# Patient Record
Sex: FEMALE | Race: WHITE
[De-identification: names, ages, dates, MRNs, and addresses within clinical notes are randomized per-mention and may not be internally consistent; named-entity substitution may affect disease eponyms.]

---

## 2021-06-04 ENCOUNTER — HOSPITAL ENCOUNTER (EMERGENCY)
Dept: HOSPITAL 79 - ER1 | Age: 56
LOS: 1 days | Discharge: HOME | End: 2021-06-05
Payer: COMMERCIAL

## 2021-06-04 DIAGNOSIS — S23.3XXA: ICD-10-CM

## 2021-06-04 DIAGNOSIS — R51.9: ICD-10-CM

## 2021-06-04 DIAGNOSIS — R07.9: ICD-10-CM
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
## 2021-06-04 DIAGNOSIS — S16.1XXA: ICD-10-CM

## 2021-06-04 DIAGNOSIS — S46.912A: ICD-10-CM

## 2021-06-04 DIAGNOSIS — S33.5XXA: Primary | ICD-10-CM

## 2021-06-04 LAB
BUN/CREATININE RATIO: 16 (ref 0–10)
HGB BLD-MCNC: 13.6 GM/DL (ref 12.3–15.3)
RED BLOOD COUNT: 4.03 M/UL (ref 4–5.1)
WHITE BLOOD COUNT: 7.3 K/UL (ref 4.5–11)

## 2022-01-12 ENCOUNTER — HOSPITAL ENCOUNTER (OUTPATIENT)
Dept: HOSPITAL 79 - RAD | Age: 57
End: 2022-01-12
Attending: NURSE PRACTITIONER
Payer: COMMERCIAL

## 2022-01-12 DIAGNOSIS — M25.561: Primary | ICD-10-CM

## 2022-01-12 DIAGNOSIS — M25.562: ICD-10-CM

## 2022-01-12 DIAGNOSIS — M79.605: ICD-10-CM

## 2022-01-12 DIAGNOSIS — M25.462: ICD-10-CM

## 2022-01-12 DIAGNOSIS — M17.11: ICD-10-CM

## 2022-03-07 ENCOUNTER — TRANSCRIBE ORDERS (OUTPATIENT)
Dept: ADMINISTRATIVE | Facility: HOSPITAL | Age: 57
End: 2022-03-07

## 2022-03-07 DIAGNOSIS — M25.562 LEFT KNEE PAIN, UNSPECIFIED CHRONICITY: Primary | ICD-10-CM

## 2022-03-18 ENCOUNTER — TRANSCRIBE ORDERS (OUTPATIENT)
Dept: ADMINISTRATIVE | Facility: HOSPITAL | Age: 57
End: 2022-03-18

## 2022-03-18 ENCOUNTER — HOSPITAL ENCOUNTER (OUTPATIENT)
Dept: GENERAL RADIOLOGY | Facility: HOSPITAL | Age: 57
Discharge: HOME | End: 2022-03-18
Admitting: NURSE PRACTITIONER

## 2022-03-18 DIAGNOSIS — M25.562 LEFT KNEE PAIN, UNSPECIFIED CHRONICITY: ICD-10-CM

## 2022-03-18 DIAGNOSIS — M25.562 LEFT KNEE PAIN, UNSPECIFIED CHRONICITY: Primary | ICD-10-CM

## 2022-03-18 DIAGNOSIS — M79.605 LEFT LEG PAIN: ICD-10-CM

## 2022-03-18 PROCEDURE — 73564 X-RAY EXAM KNEE 4 OR MORE: CPT | Performed by: RADIOLOGY

## 2022-03-18 PROCEDURE — 73564 X-RAY EXAM KNEE 4 OR MORE: CPT

## 2022-03-18 PROCEDURE — 73590 X-RAY EXAM OF LOWER LEG: CPT | Performed by: RADIOLOGY

## 2022-03-18 PROCEDURE — 73590 X-RAY EXAM OF LOWER LEG: CPT

## 2022-04-14 ENCOUNTER — OFFICE VISIT (OUTPATIENT)
Dept: ENDOCRINOLOGY | Facility: CLINIC | Age: 57
End: 2022-04-14

## 2022-04-14 ENCOUNTER — LAB (OUTPATIENT)
Dept: LAB | Facility: HOSPITAL | Age: 57
End: 2022-04-14

## 2022-04-14 VITALS
WEIGHT: 198.6 LBS | OXYGEN SATURATION: 96 % | SYSTOLIC BLOOD PRESSURE: 162 MMHG | BODY MASS INDEX: 33.09 KG/M2 | DIASTOLIC BLOOD PRESSURE: 84 MMHG | HEART RATE: 73 BPM | HEIGHT: 65 IN

## 2022-04-14 DIAGNOSIS — E03.8 HYPOTHYROIDISM DUE TO HASHIMOTO'S THYROIDITIS: Primary | ICD-10-CM

## 2022-04-14 DIAGNOSIS — E06.3 HYPOTHYROIDISM DUE TO HASHIMOTO'S THYROIDITIS: Primary | ICD-10-CM

## 2022-04-14 PROCEDURE — 84443 ASSAY THYROID STIM HORMONE: CPT | Performed by: NURSE PRACTITIONER

## 2022-04-14 PROCEDURE — 99203 OFFICE O/P NEW LOW 30 MIN: CPT | Performed by: NURSE PRACTITIONER

## 2022-04-14 PROCEDURE — 36415 COLL VENOUS BLD VENIPUNCTURE: CPT | Performed by: NURSE PRACTITIONER

## 2022-04-14 RX ORDER — CLONAZEPAM 0.5 MG/1
0.5 TABLET ORAL 2 TIMES DAILY PRN
COMMUNITY

## 2022-04-14 RX ORDER — ARIPIPRAZOLE 2 MG/1
2 TABLET ORAL DAILY
COMMUNITY

## 2022-04-14 RX ORDER — GABAPENTIN 800 MG/1
600 TABLET ORAL 3 TIMES DAILY
COMMUNITY

## 2022-04-14 RX ORDER — ATENOLOL 25 MG/1
25 TABLET ORAL DAILY
COMMUNITY

## 2022-04-14 RX ORDER — LEVOTHYROXINE SODIUM 0.03 MG/1
25 TABLET ORAL DAILY
COMMUNITY
End: 2022-04-15

## 2022-04-14 RX ORDER — MULTIPLE VITAMINS W/ MINERALS TAB 9MG-400MCG
1 TAB ORAL DAILY
COMMUNITY

## 2022-04-14 RX ORDER — ERGOCALCIFEROL 1.25 MG/1
50000 CAPSULE ORAL WEEKLY
COMMUNITY

## 2022-04-14 NOTE — PROGRESS NOTES
Chief Complaint   Patient presents with   • Thyroid Problem     PCP told pt her antibodies were attacking her thyroid   Had a knot come up on thyroid in December 2021          Referring Provider  Karen Ortiz A* HPI   Paris Adrian is a 56 y.o. female had concerns including Thyroid Problem (PCP told pt her antibodies were attacking her thyroid   Had a knot come up on thyroid in December 2021  ).   Seen as a new patient today.    She was on T4 in the past but stopped taking it for a long time, then she was started back on after her most recent labs.  She was placed on higher doses in the past and she felt like she was having side effects to the medication-she felt like it caused some discomfort in .  She started back on T4 at the end of Feb 25 mcg daily.  She doesn't always take as prescribed.  She had COVID in Sept 2021.    Birth state:[place]  Previous history of radiation to face/neck: none  Consuming foods high in iodine: none  Family history of thyroid complications: cousins, no hx of thyroid cancer that she knows of.    The following portions of the patient's history were reviewed and updated as appropriate: allergies, current medications, past family history, past medical history, past social history, past surgical history and problem list.    No past medical history on file.  Past Surgical History:   Procedure Laterality Date   • CHOLECYSTECTOMY     • HYSTERECTOMY        No family history on file.   Social History     Socioeconomic History   • Marital status:    • Number of children: 2   Tobacco Use   • Smoking status: Light Tobacco Smoker     Types: Cigarettes   • Smokeless tobacco: Never Used   • Tobacco comment: Pt says she is a social smoker    1 or 2 times a month   Vaping Use   • Vaping Use: Former   Substance and Sexual Activity   • Alcohol use: Yes     Comment: Only when something tragic happens   • Drug use: Yes     Types: Marijuana     Comment: On occasion   • Sexual activity: Yes  "     Allergies   Allergen Reactions   • Demerol [Meperidine] Itching      Current Outpatient Medications on File Prior to Visit   Medication Sig Dispense Refill   • ARIPiprazole (ABILIFY) 2 MG tablet Take 2 mg by mouth Daily.     • atenolol (TENORMIN) 25 MG tablet Take 25 mg by mouth Daily.     • clonazePAM (KlonoPIN) 0.5 MG tablet Take 0.5 mg by mouth 2 (Two) Times a Day As Needed.     • gabapentin (NEURONTIN) 600 MG tablet Take 600 mg by mouth 3 (Three) Times a Day.     • levothyroxine (SYNTHROID, LEVOTHROID) 25 MCG tablet Take 25 mcg by mouth Daily.     • multivitamin with minerals tablet tablet Take 1 tablet by mouth Daily.     • vitamin D (ERGOCALCIFEROL) 1.25 MG (97711 UT) capsule capsule Take 50,000 Units by mouth 1 (One) Time Per Week.       No current facility-administered medications on file prior to visit.        Review of Systems   Constitutional: Positive for fatigue and unexpected weight gain.   Eyes: Positive for blurred vision.   Gastrointestinal: Negative for constipation.   Endocrine: Positive for cold intolerance.   Skin: Positive for dry skin.        Hair loss-after COVID   Psychiatric/Behavioral: Positive for sleep disturbance.   All other systems reviewed and are negative.    /84 (BP Location: Right arm, Patient Position: Sitting, Cuff Size: Adult)   Pulse 73   Ht 165.1 cm (65\")   Wt 90.1 kg (198 lb 9.6 oz)   SpO2 96%   BMI 33.05 kg/m²      Physical Exam  Vitals reviewed.   Constitutional:       Appearance: Normal appearance.   Eyes:      Extraocular Movements: Extraocular movements intact.   Neck:      Thyroid: No thyroid mass, thyromegaly or thyroid tenderness.      Comments: No palpable nodules.  Cardiovascular:      Rate and Rhythm: Normal rate and regular rhythm.      Pulses: Normal pulses.      Heart sounds: Normal heart sounds.   Pulmonary:      Effort: Pulmonary effort is normal.      Breath sounds: Normal breath sounds.   Neurological:      Mental Status: She is alert and " oriented to person, place, and time.   Psychiatric:         Mood and Affect: Mood normal.         Behavior: Behavior normal.         Thought Content: Thought content normal.         Judgment: Judgment normal.       Labs/Imaging  CMP  No results found for: GLUCOSE, BUN, CREATININE, EGFRIFNONA, EGFRIFAFRI, BCR, K, CO2, CALCIUM, PROTENTOTREF, ALBUMIN, LABIL2, BILIRUBIN, AST, ALT     CBC w/DIFF No results found for: WBC, RBC, HGB, HCT, MCV, MCH, MCHC, RDW, RDWSD, MPV, PLT, NEUTRORELPCT, LYMPHORELPCT, MONORELPCT, EOSRELPCT, BASORELPCT, AUTOIGPER, NEUTROABS, LYMPHSABS, MONOSABS, EOSABS, BASOSABS, AUTOIGNUM, NRBC    TSH  No results found for: TSH    T4  No results found for: FREET4  No results found for: [id number]    T3  No results found for: T3FREE  No results found for: R2XFSYL    TRAb  No results found for: TSURCPAB    TPO  No results found for: THYROIDAB    No valid procedures specified.    Assessment and Plan    Diagnoses and all orders for this visit:    1. Hypothyroidism due to Hashimoto's thyroiditis (Primary)  Assessment & Plan:  -Clinically hypothyroid.  -Will obtain TFT's today and adjust meds as indicated.  -Follow-up in 6 weeks.    Orders:  -     Cancel: TSH  -     TSH  -     TSH       Return in about 6 weeks (around 5/26/2022) for Follow-up appointment. The patient was instructed to contact the clinic with any interval questions or concerns.    JEANIE Wright   Endocrinology

## 2022-04-14 NOTE — ASSESSMENT & PLAN NOTE
-Clinically hypothyroid.  -Will obtain TFT's today and adjust meds as indicated.  -Follow-up in 6 weeks.

## 2022-04-15 DIAGNOSIS — M25.562 LEFT KNEE PAIN, UNSPECIFIED CHRONICITY: Primary | ICD-10-CM

## 2022-04-15 LAB — TSH SERPL DL<=0.05 MIU/L-ACNC: 13.7 UIU/ML (ref 0.27–4.2)

## 2022-04-15 RX ORDER — LEVOTHYROXINE SODIUM 0.05 MG/1
25 TABLET ORAL DAILY
Qty: 30 TABLET | Refills: 2 | Status: SHIPPED | OUTPATIENT
Start: 2022-04-15 | End: 2022-04-20 | Stop reason: SDUPTHER

## 2022-04-20 ENCOUNTER — TELEPHONE (OUTPATIENT)
Dept: ENDOCRINOLOGY | Facility: CLINIC | Age: 57
End: 2022-04-20

## 2022-04-20 RX ORDER — LEVOTHYROXINE SODIUM 0.05 MG/1
25 TABLET ORAL DAILY
Qty: 30 TABLET | Refills: 2 | Status: SHIPPED | OUTPATIENT
Start: 2022-04-20

## 2022-04-20 NOTE — TELEPHONE ENCOUNTER
----- Message from JEANIE Wright sent at 4/15/2022 12:39 PM EDT -----  Please call and let her know that her thyroid levels are still significantly elevated.  She needs to increase Levothyroxine to 50 mcg daily.  Will send meds to pharmacy.

## 2022-05-23 ENCOUNTER — HOSPITAL ENCOUNTER (EMERGENCY)
Dept: HOSPITAL 79 - ER1 | Age: 57
LOS: 1 days | Discharge: LEFT BEFORE DISCHARGE | End: 2022-05-24
Payer: COMMERCIAL

## 2022-05-23 DIAGNOSIS — Z53.21: Primary | ICD-10-CM

## 2022-10-12 ENCOUNTER — HOSPITAL ENCOUNTER (OUTPATIENT)
Dept: GENERAL RADIOLOGY | Facility: HOSPITAL | Age: 57
Discharge: HOME | End: 2022-10-12
Admitting: NURSE PRACTITIONER

## 2022-10-12 ENCOUNTER — TRANSCRIBE ORDERS (OUTPATIENT)
Dept: ADMINISTRATIVE | Facility: HOSPITAL | Age: 57
End: 2022-10-12

## 2022-10-12 DIAGNOSIS — M54.2 CERVICAL PAIN: Primary | ICD-10-CM

## 2022-10-12 PROCEDURE — 72050 X-RAY EXAM NECK SPINE 4/5VWS: CPT

## 2022-10-12 PROCEDURE — 72050 X-RAY EXAM NECK SPINE 4/5VWS: CPT | Performed by: RADIOLOGY

## 2022-11-23 ENCOUNTER — TRANSCRIBE ORDERS (OUTPATIENT)
Dept: ADMINISTRATIVE | Facility: HOSPITAL | Age: 57
End: 2022-11-23

## 2022-11-23 DIAGNOSIS — M25.562 LEFT KNEE PAIN, UNSPECIFIED CHRONICITY: Primary | ICD-10-CM

## 2022-12-16 ENCOUNTER — HOSPITAL ENCOUNTER (OUTPATIENT)
Dept: MRI IMAGING | Facility: HOSPITAL | Age: 57
Discharge: HOME | End: 2022-12-16

## 2022-12-16 ENCOUNTER — HOSPITAL ENCOUNTER (OUTPATIENT)
Dept: GENERAL RADIOLOGY | Facility: HOSPITAL | Age: 57
Discharge: HOME | End: 2022-12-16

## 2022-12-16 ENCOUNTER — TRANSCRIBE ORDERS (OUTPATIENT)
Dept: ADMINISTRATIVE | Facility: HOSPITAL | Age: 57
End: 2022-12-16

## 2022-12-16 DIAGNOSIS — M25.562 LEFT KNEE PAIN, UNSPECIFIED CHRONICITY: ICD-10-CM

## 2022-12-16 DIAGNOSIS — M54.40 LOW BACK PAIN WITH SCIATICA, SCIATICA LATERALITY UNSPECIFIED, UNSPECIFIED BACK PAIN LATERALITY, UNSPECIFIED CHRONICITY: ICD-10-CM

## 2022-12-16 DIAGNOSIS — M54.40 LOW BACK PAIN WITH SCIATICA, SCIATICA LATERALITY UNSPECIFIED, UNSPECIFIED BACK PAIN LATERALITY, UNSPECIFIED CHRONICITY: Primary | ICD-10-CM

## 2022-12-16 PROCEDURE — 72110 X-RAY EXAM L-2 SPINE 4/>VWS: CPT | Performed by: RADIOLOGY

## 2022-12-16 PROCEDURE — 73721 MRI JNT OF LWR EXTRE W/O DYE: CPT | Performed by: RADIOLOGY

## 2022-12-16 PROCEDURE — 73721 MRI JNT OF LWR EXTRE W/O DYE: CPT

## 2022-12-16 PROCEDURE — 72110 X-RAY EXAM L-2 SPINE 4/>VWS: CPT

## 2023-01-30 ENCOUNTER — OFFICE VISIT (OUTPATIENT)
Dept: ORTHOPEDIC SURGERY | Facility: CLINIC | Age: 58
End: 2023-01-30
Payer: COMMERCIAL

## 2023-01-30 VITALS
DIASTOLIC BLOOD PRESSURE: 64 MMHG | SYSTOLIC BLOOD PRESSURE: 97 MMHG | HEART RATE: 85 BPM | BODY MASS INDEX: 32.99 KG/M2 | HEIGHT: 65 IN | WEIGHT: 198 LBS

## 2023-01-30 DIAGNOSIS — M25.562 LEFT KNEE PAIN, UNSPECIFIED CHRONICITY: ICD-10-CM

## 2023-01-30 DIAGNOSIS — S83.282A OTHER TEAR OF LATERAL MENISCUS OF LEFT KNEE AS CURRENT INJURY, INITIAL ENCOUNTER: Primary | ICD-10-CM

## 2023-01-30 PROCEDURE — 20610 DRAIN/INJ JOINT/BURSA W/O US: CPT | Performed by: PHYSICIAN ASSISTANT

## 2023-01-30 PROCEDURE — 99203 OFFICE O/P NEW LOW 30 MIN: CPT | Performed by: PHYSICIAN ASSISTANT

## 2023-01-30 RX ORDER — LIDOCAINE HYDROCHLORIDE 10 MG/ML
5 INJECTION, SOLUTION EPIDURAL; INFILTRATION; INTRACAUDAL; PERINEURAL
Status: COMPLETED | OUTPATIENT
Start: 2023-01-30 | End: 2023-01-30

## 2023-01-30 RX ORDER — TRAMADOL HYDROCHLORIDE 50 MG/1
TABLET ORAL
COMMUNITY
Start: 2023-01-13

## 2023-01-30 RX ORDER — METHYLPREDNISOLONE ACETATE 80 MG/ML
80 INJECTION, SUSPENSION INTRA-ARTICULAR; INTRALESIONAL; INTRAMUSCULAR; SOFT TISSUE
Status: COMPLETED | OUTPATIENT
Start: 2023-01-30 | End: 2023-01-30

## 2023-01-30 RX ADMIN — METHYLPREDNISOLONE ACETATE 80 MG: 80 INJECTION, SUSPENSION INTRA-ARTICULAR; INTRALESIONAL; INTRAMUSCULAR; SOFT TISSUE at 16:06

## 2023-01-30 RX ADMIN — LIDOCAINE HYDROCHLORIDE 5 ML: 10 INJECTION, SOLUTION EPIDURAL; INFILTRATION; INTRACAUDAL; PERINEURAL at 16:06

## 2023-01-30 NOTE — PROGRESS NOTES
[facility] [place] [facility] New Patient Visit          Patient: Paris Adrian  YOB: 1965  Date of Encounter: 01/30/2023  PCP: Nasir De La Garza APRN      Subjective     Chief Complaint   Patient presents with   • Left Knee - Initial Evaluation, Pain           History of Present Illness:     Paris Adrian is a 57 y.o. female presents today as result of left knee pain ongoing for the last several months.  Patient reports no specific injury.  She describes that there are times in which over the last year and a half that she will have a catching sensation and popping with difficulty upon ambulation and weightbearing.  The patient reports intermittent swelling as well as pain anterior laterally.  The patient has undergone no conservative treatment options in reference to injections however the patient has undergone formal outpatient physical therapy temporarily with no benefit.  No improvement with NSAIDs.  Patient presents today for MRI results review left knee and consultation.        Patient Active Problem List   Diagnosis   • Hypothyroidism due to Hashimoto's thyroiditis     Past Medical History:   Diagnosis Date   • Thyroid disease      Past Surgical History:   Procedure Laterality Date   • CHOLECYSTECTOMY     • HYSTERECTOMY       Social History     Occupational History   • Not on file   Tobacco Use   • Smoking status: Light Smoker     Packs/day: 1.00     Types: Cigarettes   • Smokeless tobacco: Never   • Tobacco comments:     Pt says she is a social smoker    1 or 2 times a month   Vaping Use   • Vaping Use: Never used   Substance and Sexual Activity   • Alcohol use: Not Currently   • Drug use: Never   • Sexual activity: Yes    Paris Adrian  reports that she has been smoking cigarettes. She has been smoking an average of 1 pack per day. She has never used smokeless tobacco.. I have educated her on the risk of diseases from using tobacco products such as cancer, COPD and heart disease.  "    I advised her to quit and she is not willing to quit.    I spent 3  minutes counseling the patient.          Social History     Social History Narrative   • Not on file     History reviewed. No pertinent family history.  Current Outpatient Medications   Medication Sig Dispense Refill   • atenolol (TENORMIN) 25 MG tablet Take 25 mg by mouth Daily.     • clonazePAM (KlonoPIN) 0.5 MG tablet Take 0.5 mg by mouth 2 (Two) Times a Day As Needed.     • gabapentin (NEURONTIN) 800 MG tablet Take 600 mg by mouth 3 (Three) Times a Day.     • levothyroxine (SYNTHROID, LEVOTHROID) 50 MCG tablet Take 0.5 tablets by mouth Daily. 30 tablet 2   • traMADol (ULTRAM) 50 MG tablet TAKE TWO TABLETS BY MOUTH TWO TIMES A DAY FOR PAIN     • vitamin D (ERGOCALCIFEROL) 1.25 MG (67594 UT) capsule capsule Take 50,000 Units by mouth 1 (One) Time Per Week.     • ARIPiprazole (ABILIFY) 2 MG tablet Take 2 mg by mouth Daily.     • multivitamin with minerals tablet tablet Take 1 tablet by mouth Daily.       No current facility-administered medications for this visit.     Allergies   Allergen Reactions   • Demerol [Meperidine] Itching            Review of Systems   Constitutional:        Activity change   HENT: Negative.    Eyes: Negative.    Cardiovascular: Positive for leg swelling.   Respiratory: Negative.    Endocrine: Negative.    Hematologic/Lymphatic: Bruises/bleeds easily.   Skin: Negative.    Musculoskeletal: Positive for back pain, joint pain, joint swelling and neck pain.        Pertinent positives listed in HPI   Gastrointestinal: Negative.    Genitourinary: Negative.    Neurological: Positive for numbness and weakness.   Psychiatric/Behavioral: The patient has insomnia and is nervous/anxious.    Allergic/Immunologic: Negative.          Objective      Vitals:    01/30/23 1527   BP: 97/64   Pulse: 85   Weight: 89.8 kg (198 lb)   Height: 165.1 cm (65\")      BMI is >= 30 and <35. (Class 1 Obesity). The following options were offered after " discussion;: exercise counseling/recommendations and nutrition counseling/recommendations      Physical Exam  Vitals and nursing note reviewed.   Constitutional:       General: She is not in acute distress.     Appearance: She is not ill-appearing.   HENT:      Head: Normocephalic and atraumatic.      Right Ear: External ear normal.      Left Ear: External ear normal.      Nose: Nose normal. No congestion or rhinorrhea.   Eyes:      Extraocular Movements: Extraocular movements intact.      Conjunctiva/sclera: Conjunctivae normal.      Pupils: Pupils are equal, round, and reactive to light.   Cardiovascular:      Rate and Rhythm: Normal rate.      Pulses: Normal pulses.   Pulmonary:      Effort: Pulmonary effort is normal. No respiratory distress.      Breath sounds: No stridor.   Abdominal:      General: There is no distension.   Musculoskeletal:      Cervical back: Normal range of motion.      Comments: Left knee reveals scant effusion with medial and lateral joint line tenderness to palpation.  Patient does have mild valgus alignment with no significant laxity upon varus stress.  Lachman and drawer testing negative.  Ben's and Apley's grind test positive laterally.  Neurovascular status grossly intact left lower extremity.   Skin:     General: Skin is warm and dry.      Capillary Refill: Capillary refill takes less than 2 seconds.   Neurological:      General: No focal deficit present.      Mental Status: She is alert and oriented to person, place, and time.   Psychiatric:         Mood and Affect: Mood normal.         Behavior: Behavior normal.         Thought Content: Thought content normal.         Judgment: Judgment normal.                 Radiology:        MRI Knee Left Without Contrast    Result Date: 12/16/2022  1.  Small knee joint effusion. 2.  Tear involving the anterior horn and body of the lateral meniscus. 3.  Lateral joint space narrowing and lateral joint space cartilage thinning. 4.  Small  popliteal region cyst measuring 2.5 cm.  This report was finalized on 12/16/2022 1:30 PM by Dr. Vin Jansen MD.      XR Spine Lumbar Complete 4+VW    Result Date: 12/16/2022    Degenerative changes lumbar spine as described.  This report was finalized on 12/16/2022 12:10 PM by Dr. Vin Jansen MD.            Assessment/Plan        ICD-10-CM ICD-9-CM   1. Left knee pain, unspecified chronicity  M25.562 719.46   2. Other tear of lateral meniscus of left knee as current injury, initial encounter  S83.282A 836.1       57-year-old female with longstanding left knee pain with MRI evidence consistent with recurrent knee effusion as well as a suspected degenerative tear involving anterior horn lateral meniscus in the body with lateral joint space narrowing joint space cartilage thinning.  Further discussion was had with patient and given the degenerative changes the patient was provided with an intra-articular steroidal injection with 80 mg Depo-Medrol and lidocaine block injected into the intra-articular space of the left knee.  The patient tolerated this procedure well.  She was instructed to return back in 4 weeks for further evaluation.      Large Joint Arthrocentesis: L knee  Date/Time: 1/30/2023 4:06 PM  Consent given by: patient  Site marked: site marked  Timeout: Immediately prior to procedure a time out was called to verify the correct patient, procedure, equipment, support staff and site/side marked as required   Supporting Documentation  Indications: pain and diagnostic evaluation   Procedure Details  Location: knee - L knee  Needle size: 25 G  Approach: anterolateral  Medications administered: 80 mg methylPREDNISolone acetate 80 MG/ML; 5 mL lidocaine PF 1% 1 %  Patient tolerance: patient tolerated the procedure well with no immediate complications                      This document was signed by Luis Marcelino PA-C January 30, 2023     CC: Nasir De La Garza APRN    Dictated Utilizing Dragon Dictation:    Please note that portions of this note were completed with a voice recognition program.   Part of this note may be an electronic transcription/translation of spoken language to printed text using the Dragon Dictation System.

## 2023-03-06 ENCOUNTER — OFFICE VISIT (OUTPATIENT)
Dept: ORTHOPEDIC SURGERY | Facility: CLINIC | Age: 58
End: 2023-03-06
Payer: COMMERCIAL

## 2023-03-06 VITALS — WEIGHT: 173 LBS | HEIGHT: 65 IN | BODY MASS INDEX: 28.82 KG/M2

## 2023-03-06 DIAGNOSIS — S83.282A OTHER TEAR OF LATERAL MENISCUS OF LEFT KNEE AS CURRENT INJURY, INITIAL ENCOUNTER: Primary | ICD-10-CM

## 2023-03-06 DIAGNOSIS — M25.562 LEFT KNEE PAIN, UNSPECIFIED CHRONICITY: ICD-10-CM

## 2023-03-06 PROCEDURE — 99213 OFFICE O/P EST LOW 20 MIN: CPT | Performed by: PHYSICIAN ASSISTANT

## 2023-03-13 ENCOUNTER — TELEPHONE (OUTPATIENT)
Dept: ORTHOPEDIC SURGERY | Facility: CLINIC | Age: 58
End: 2023-03-13
Payer: COMMERCIAL

## 2023-03-13 NOTE — TELEPHONE ENCOUNTER
Caller: SORIN   Relationship to Patient: SELF   Phone Number: [phone]   Reason for Call: PATIENT CALLING ASKING ABOUT REFERRAL FROM 03/06/23 STATES MATT WAS SUPPOSED TO SEND A REFERRAL FOR HER BUT NOTES NOT SIGNED

## 2023-03-13 NOTE — TELEPHONE ENCOUNTER
Returned patients call and spoke to . Let the  know that once the note is done and the order was placed we would start working on referral.

## 2023-03-20 ENCOUNTER — TELEPHONE (OUTPATIENT)
Dept: ORTHOPEDIC SURGERY | Facility: CLINIC | Age: 58
End: 2023-03-20
Payer: COMMERCIAL

## 2023-03-20 NOTE — TELEPHONE ENCOUNTER
Caller: MARTINE    Relationship: SELF    Best call back number: [phone]      What was the call regarding: PATIENT IS CALLING IN REGARDS TO GETTING HER SURGERY SET UP.     Do you require a callback: YES

## 2023-03-20 NOTE — PROGRESS NOTES
[facility] [place]          Patient: Paris Adrian  YOB: 1965  Date of Encounter: 3/6/2023  PCP: Nasir De La Garza APRN      Subjective     Chief Complaint   Patient presents with   • Left Knee - Follow-up, Pain, Edema           History of Present Illness:     Paris Adrian is a 57 y.o. female presents today as result of left knee pain ongoing for the last several months.  Patient reports no specific injury.  She describes that there are times in which over the last year and a half that she will have a catching sensation and popping with difficulty upon ambulation and weightbearing.  The patient reports intermittent swelling as well as pain anterior laterally.  The patient has undergone therapy as well as previous steroid injection with continuation of pain and symptoms and recurrence of intermittent swelling.  She reports no significant improvement since last office visit.         Patient Active Problem List   Diagnosis   • Hypothyroidism due to Hashimoto's thyroiditis     Past Medical History:   Diagnosis Date   • Thyroid disease      Past Surgical History:   Procedure Laterality Date   •  SECTION      X2   • CHOLECYSTECTOMY     • HYSTERECTOMY       Social History     Occupational History   • Not on file   Tobacco Use   • Smoking status: Every Day     Packs/day: 0.25     Types: Cigarettes   • Smokeless tobacco: Never   • Tobacco comments:     Pt says she is a social smoker    1 or 2 times a month   Vaping Use   • Vaping Use: Never used   Substance and Sexual Activity   • Alcohol use: Not Currently   • Drug use: Never   • Sexual activity: Yes    Paris Adrian  reports that she has been smoking cigarettes. She has been smoking an average of .25 packs per day. She has never used smokeless tobacco.. I have educated her on the risk of diseases from using tobacco products such as cancer, COPD and heart disease.     I advised her to quit and she is  "not willing to quit.    I spent 3  minutes counseling the patient.          Social History     Social History Narrative   • Not on file     History reviewed. No pertinent family history.  Current Outpatient Medications   Medication Sig Dispense Refill   • clonazePAM (KlonoPIN) 0.5 MG tablet Take 1 tablet by mouth 2 (Two) Times a Day As Needed.     • gabapentin (NEURONTIN) 800 MG tablet Take 600 mg by mouth 3 (Three) Times a Day.     • levothyroxine (SYNTHROID, LEVOTHROID) 50 MCG tablet Take 0.5 tablets by mouth Daily. 30 tablet 2   • traMADol (ULTRAM) 50 MG tablet TAKE TWO TABLETS BY MOUTH TWO TIMES A DAY FOR PAIN     • vitamin D (ERGOCALCIFEROL) 1.25 MG (74144 UT) capsule capsule Take 1 capsule by mouth 1 (One) Time Per Week.     • ARIPiprazole (ABILIFY) 2 MG tablet Take 1 tablet by mouth Daily.     • atenolol (TENORMIN) 25 MG tablet Take 1 tablet by mouth Daily.     • multivitamin with minerals tablet tablet Take 1 tablet by mouth Daily.       No current facility-administered medications for this visit.     Allergies   Allergen Reactions   • Demerol [Meperidine] Itching            Review of Systems   Constitutional:        Activity change   HENT: Negative.    Eyes: Negative.    Cardiovascular: Positive for leg swelling.   Respiratory: Negative.    Endocrine: Negative.    Hematologic/Lymphatic: Bruises/bleeds easily.   Skin: Negative.    Musculoskeletal: Positive for back pain, joint pain, joint swelling and neck pain.        Pertinent positives listed in HPI   Gastrointestinal: Negative.    Genitourinary: Negative.    Neurological: Positive for numbness and weakness.   Psychiatric/Behavioral: The patient has insomnia and is nervous/anxious.    Allergic/Immunologic: Negative.          Objective      Vitals:    03/06/23 0913   Weight: 78.5 kg (173 lb)   Height: 165.1 cm (65\")      BMI is >= 30 and <35. (Class 1 Obesity). The following options were offered after discussion;: exercise counseling/recommendations and " nutrition counseling/recommendations      Physical Exam  Vitals and nursing note reviewed.   Constitutional:       General: She is not in acute distress.     Appearance: She is not ill-appearing.   HENT:      Head: Normocephalic and atraumatic.      Right Ear: External ear normal.      Left Ear: External ear normal.      Nose: Nose normal. No congestion or rhinorrhea.   Eyes:      Extraocular Movements: Extraocular movements intact.      Conjunctiva/sclera: Conjunctivae normal.      Pupils: Pupils are equal, round, and reactive to light.   Cardiovascular:      Rate and Rhythm: Normal rate.      Pulses: Normal pulses.   Pulmonary:      Effort: Pulmonary effort is normal. No respiratory distress.      Breath sounds: No stridor.   Abdominal:      General: There is no distension.   Musculoskeletal:      Cervical back: Normal range of motion.      Comments: Left knee reveals scant effusion with medial and lateral joint line tenderness to palpation.  Patient does have mild valgus alignment with no significant laxity upon varus stress.  Lachman and drawer testing negative.  Ben's and Apley's grind test positive laterally.  Neurovascular status grossly intact left lower extremity.   Skin:     General: Skin is warm and dry.      Capillary Refill: Capillary refill takes less than 2 seconds.   Neurological:      General: No focal deficit present.      Mental Status: She is alert and oriented to person, place, and time.   Psychiatric:         Mood and Affect: Mood normal.         Behavior: Behavior normal.         Thought Content: Thought content normal.         Judgment: Judgment normal.                 Radiology:        EXAM:    MR Left Lower Extremity Without Intravenous Contrast, Knee     EXAM DATE:    12/16/2022 12:40 PM     CLINICAL HISTORY:    m25.562; M25.562-Pain in left knee     TECHNIQUE:    Multiplanar magnetic resonance images of the left knee without  intravenous contrast.     COMPARISON:    No relevant prior  studies available.     FINDINGS:   BONES/JOINTS/CARTILAGE:    PATELLOFEMORAL COMPARTMENT:  Small knee joint effusion.    FEMOROTIBIAL COMPARTMENTS:  Unremarkable.       EXTENSOR MECHANISM:  Unremarkable.    MEDIAL MENISCUS:  Unremarkable.  No tear.    LATERAL MENISCUS:  Tear involving the anterior horn and body of the  lateral meniscus.    MEDIAL CAPSULE/SUPPORTING STRUCTURES:  Unremarkable.  Normal medial  collateral ligament.    LATERAL CAPSULE/SUPPORTING STRUCTURES:  Unremarkable.  Normal lateral  collateral ligament.    ANTERIOR CRUCIATE LIGAMENT:  Unremarkable.    POSTERIOR CRUCIATE LIGAMENT:  Unremarkable.    MUSCULATURE:  Unremarkable.    SOFT TISSUES:  Small popliteal region cyst measuring 2.5 cm.    OTHER FINDINGS:  Lateral joint space narrowing and lateral joint space  cartilage thinning.     IMPRESSION:  1.  Small knee joint effusion.  2.  Tear involving the anterior horn and body of the lateral meniscus.  3.  Lateral joint space narrowing and lateral joint space cartilage  thinning.  4.  Small popliteal region cyst measuring 2.5 cm.     This report was finalized on 12/16/2022 1:30 PM by Dr. Vin Jansen MD.             Assessment/Plan        ICD-10-CM ICD-9-CM   1. Other tear of lateral meniscus of left knee as current injury, initial encounter  S83.282A 836.1   2. Left knee pain, unspecified chronicity  M25.562 719.46       57-year-old female with longstanding left knee pain and MRI evidence consistent with recurrent knee effusion as well as degenerative anterior horn lateral meniscus tear.  Patient has lateral joint space narrowing and lateral compartment cartilage thinning.  She has failed conservative treatment options with continuation of pain and symptoms.  We discussed the possibility of further diagnostic and therapeutic knee arthroscopy with partial meniscectomy and chondroplasty.  We discussed the risks, benefits, future outcomes of surgery and patient was adamant that she wishes to proceed  with surgical intervention.  Referral has been placed with [facility] orthopedics secondary to the patient's age and activity level as well as the degenerative changes and notable atypical anterior horn lateral meniscus tear.  Patient will undergo consultation and discussion of surgical intervention.                      This document was signed by Luis Marcelino PA-C March 6, 2023    CC: Nasir De La Garza APRN    Dictated Utilizing Dragon Dictation:   Please note that portions of this note were completed with a voice recognition program.   Part of this note may be an electronic transcription/translation of spoken language to printed text using the Dragon Dictation System.

## 2023-03-20 NOTE — TELEPHONE ENCOUNTER
PATIENT CALLED BACK-SAYS IF SHE DOES NOT ANSWER, CALL HER  LANRE SAMPSON CELL # [phone]. CAN LEAVE A VOICE MAIL MESSAGE.

## 2023-03-21 NOTE — TELEPHONE ENCOUNTER
Spoke with patient let her know the referral was refaxed today and she should be receiving a call.

## 2023-11-10 ENCOUNTER — HOSPITAL ENCOUNTER (OUTPATIENT)
Dept: GENERAL RADIOLOGY | Facility: HOSPITAL | Age: 58
Discharge: HOME | End: 2023-11-10
Admitting: ANESTHESIOLOGY
Payer: COMMERCIAL

## 2023-11-10 ENCOUNTER — TRANSCRIBE ORDERS (OUTPATIENT)
Dept: ADMINISTRATIVE | Facility: HOSPITAL | Age: 58
End: 2023-11-10
Payer: COMMERCIAL

## 2023-11-10 DIAGNOSIS — M25.511 RIGHT SHOULDER PAIN, UNSPECIFIED CHRONICITY: Primary | ICD-10-CM

## 2023-11-10 PROCEDURE — 73030 X-RAY EXAM OF SHOULDER: CPT

## 2024-03-26 ENCOUNTER — TRANSCRIBE ORDERS (OUTPATIENT)
Dept: ADMINISTRATIVE | Facility: HOSPITAL | Age: 59
End: 2024-03-26
Payer: COMMERCIAL

## 2024-03-26 DIAGNOSIS — E03.9 HYPOTHYROIDISM, ADULT: Primary | ICD-10-CM

## 2024-04-26 ENCOUNTER — TRANSCRIBE ORDERS (OUTPATIENT)
Dept: ADMINISTRATIVE | Facility: HOSPITAL | Age: 59
End: 2024-04-26
Payer: COMMERCIAL

## 2024-04-26 DIAGNOSIS — Z12.31 VISIT FOR SCREENING MAMMOGRAM: Primary | ICD-10-CM

## 2024-06-24 ENCOUNTER — TRANSCRIBE ORDERS (OUTPATIENT)
Dept: ADMINISTRATIVE | Facility: HOSPITAL | Age: 59
End: 2024-06-24
Payer: COMMERCIAL

## 2024-06-24 DIAGNOSIS — G89.29 CHRONIC RIGHT SHOULDER PAIN: Primary | ICD-10-CM

## 2024-06-24 DIAGNOSIS — M25.511 CHRONIC RIGHT SHOULDER PAIN: Primary | ICD-10-CM

## 2024-08-06 ENCOUNTER — HOSPITAL ENCOUNTER (OUTPATIENT)
Dept: MRI IMAGING | Facility: HOSPITAL | Age: 59
Discharge: HOME | End: 2024-08-06
Admitting: NURSE PRACTITIONER
Payer: COMMERCIAL

## 2024-08-06 DIAGNOSIS — G89.29 CHRONIC RIGHT SHOULDER PAIN: ICD-10-CM

## 2024-08-06 DIAGNOSIS — M25.511 CHRONIC RIGHT SHOULDER PAIN: ICD-10-CM

## 2024-08-06 PROCEDURE — 73221 MRI JOINT UPR EXTREM W/O DYE: CPT | Performed by: RADIOLOGY

## 2024-08-06 PROCEDURE — 73221 MRI JOINT UPR EXTREM W/O DYE: CPT

## 2025-08-18 ENCOUNTER — TRANSCRIBE ORDERS (OUTPATIENT)
Dept: ADMINISTRATIVE | Facility: HOSPITAL | Age: 60
End: 2025-08-18
Payer: COMMERCIAL

## 2025-08-18 DIAGNOSIS — G89.29 CHRONIC BILATERAL LOW BACK PAIN WITHOUT SCIATICA: Primary | ICD-10-CM

## 2025-08-18 DIAGNOSIS — M54.50 CHRONIC BILATERAL LOW BACK PAIN WITHOUT SCIATICA: Primary | ICD-10-CM
